# Patient Record
Sex: FEMALE | Race: BLACK OR AFRICAN AMERICAN
[De-identification: names, ages, dates, MRNs, and addresses within clinical notes are randomized per-mention and may not be internally consistent; named-entity substitution may affect disease eponyms.]

---

## 2018-02-02 LAB
ALBUMIN SERPL-MCNC: 3.7 G/DL (ref 3.5–5)
ALP SERPL-CCNC: 85 U/L (ref 38–126)
ALT SERPL-CCNC: 24 U/L (ref 9–52)
ANION GAP SERPL CALC-SCNC: 9 MMOL/L (ref 5–19)
APPEARANCE UR: (no result)
APTT PPP: YELLOW S
AST SERPL-CCNC: 14 U/L (ref 14–36)
BILIRUB DIRECT SERPL-MCNC: 0.3 MG/DL (ref 0–0.4)
BILIRUB SERPL-MCNC: 0.3 MG/DL (ref 0.2–1.3)
BILIRUB UR QL STRIP: NEGATIVE
BUN SERPL-MCNC: 11 MG/DL (ref 7–20)
CALCIUM: 9.8 MG/DL (ref 8.4–10.2)
CHLORIDE SERPL-SCNC: 107 MMOL/L (ref 98–107)
CO2 SERPL-SCNC: 27 MMOL/L (ref 22–30)
ERYTHROCYTE [DISTWIDTH] IN BLOOD BY AUTOMATED COUNT: 16.2 % (ref 11.5–14)
GLUCOSE SERPL-MCNC: 103 MG/DL (ref 75–110)
GLUCOSE UR STRIP-MCNC: NEGATIVE MG/DL
HCT VFR BLD CALC: 39.3 % (ref 36–47)
HGB BLD-MCNC: 13 G/DL (ref 12–15.5)
KETONES UR STRIP-MCNC: NEGATIVE MG/DL
MCH RBC QN AUTO: 24.7 PG (ref 27–33.4)
MCHC RBC AUTO-ENTMCNC: 33.1 G/DL (ref 32–36)
MCV RBC AUTO: 75 FL (ref 80–97)
NITRITE UR QL STRIP: NEGATIVE
PH UR STRIP: 6 [PH] (ref 5–9)
PLATELET # BLD: 363 10^3/UL (ref 150–450)
POTASSIUM SERPL-SCNC: 4.6 MMOL/L (ref 3.6–5)
PROT SERPL-MCNC: 6.6 G/DL (ref 6.3–8.2)
PROT UR STRIP-MCNC: NEGATIVE MG/DL
RBC # BLD AUTO: 5.27 10^6/UL (ref 3.72–5.28)
SODIUM SERPL-SCNC: 143.1 MMOL/L (ref 137–145)
SP GR UR STRIP: 1.02
UROBILINOGEN UR-MCNC: NEGATIVE MG/DL (ref ?–2)
WBC # BLD AUTO: 7.3 10^3/UL (ref 4–10.5)

## 2018-02-08 ENCOUNTER — HOSPITAL ENCOUNTER (INPATIENT)
Dept: HOSPITAL 62 - INOR | Age: 53
LOS: 2 days | Discharge: HOME | DRG: 743 | End: 2018-02-10
Attending: OBSTETRICS & GYNECOLOGY | Admitting: OBSTETRICS & GYNECOLOGY
Payer: MEDICAID

## 2018-02-08 DIAGNOSIS — N92.0: ICD-10-CM

## 2018-02-08 DIAGNOSIS — D25.1: Primary | ICD-10-CM

## 2018-02-08 DIAGNOSIS — F17.210: ICD-10-CM

## 2018-02-08 LAB
ADD MANUAL DIFF: NO
BASOPHILS # BLD AUTO: 0.1 10^3/UL (ref 0–0.2)
BASOPHILS NFR BLD AUTO: 0.5 % (ref 0–2)
EOSINOPHIL # BLD AUTO: 0 10^3/UL (ref 0–0.6)
EOSINOPHIL NFR BLD AUTO: 0.1 % (ref 0–6)
ERYTHROCYTE [DISTWIDTH] IN BLOOD BY AUTOMATED COUNT: 16.5 % (ref 11.5–14)
HCT VFR BLD CALC: 34.4 % (ref 36–47)
HGB BLD-MCNC: 11 G/DL (ref 12–15.5)
LYMPHOCYTES # BLD AUTO: 1.1 10^3/UL (ref 0.5–4.7)
LYMPHOCYTES NFR BLD AUTO: 7.2 % (ref 13–45)
MCH RBC QN AUTO: 24 PG (ref 27–33.4)
MCHC RBC AUTO-ENTMCNC: 31.9 G/DL (ref 32–36)
MCV RBC AUTO: 75 FL (ref 80–97)
MONOCYTES # BLD AUTO: 0.2 10^3/UL (ref 0.1–1.4)
MONOCYTES NFR BLD AUTO: 1.3 % (ref 3–13)
NEUTROPHILS # BLD AUTO: 14.3 10^3/UL (ref 1.7–8.2)
NEUTS SEG NFR BLD AUTO: 90.9 % (ref 42–78)
PLATELET # BLD: 345 10^3/UL (ref 150–450)
RBC # BLD AUTO: 4.57 10^6/UL (ref 3.72–5.28)
TOTAL CELLS COUNTED % (AUTO): 100 %
WBC # BLD AUTO: 15.7 10^3/UL (ref 4–10.5)

## 2018-02-08 PROCEDURE — 36415 COLL VENOUS BLD VENIPUNCTURE: CPT

## 2018-02-08 PROCEDURE — 0UT20ZZ RESECTION OF BILATERAL OVARIES, OPEN APPROACH: ICD-10-PCS | Performed by: OBSTETRICS & GYNECOLOGY

## 2018-02-08 PROCEDURE — 0UT70ZZ RESECTION OF BILATERAL FALLOPIAN TUBES, OPEN APPROACH: ICD-10-PCS | Performed by: OBSTETRICS & GYNECOLOGY

## 2018-02-08 PROCEDURE — 80053 COMPREHEN METABOLIC PANEL: CPT

## 2018-02-08 PROCEDURE — 88307 TISSUE EXAM BY PATHOLOGIST: CPT

## 2018-02-08 PROCEDURE — 86901 BLOOD TYPING SEROLOGIC RH(D): CPT

## 2018-02-08 PROCEDURE — 81025 URINE PREGNANCY TEST: CPT

## 2018-02-08 PROCEDURE — 86900 BLOOD TYPING SEROLOGIC ABO: CPT

## 2018-02-08 PROCEDURE — 85027 COMPLETE CBC AUTOMATED: CPT

## 2018-02-08 PROCEDURE — 85025 COMPLETE CBC W/AUTO DIFF WBC: CPT

## 2018-02-08 PROCEDURE — 81001 URINALYSIS AUTO W/SCOPE: CPT

## 2018-02-08 PROCEDURE — 86850 RBC ANTIBODY SCREEN: CPT

## 2018-02-08 PROCEDURE — 0UT90ZL RESECTION OF UTERUS, SUPRACERVICAL, OPEN APPROACH: ICD-10-PCS | Performed by: OBSTETRICS & GYNECOLOGY

## 2018-02-08 RX ADMIN — LABETALOL HYDROCHLORIDE SCH MG: 200 TABLET, FILM COATED ORAL at 22:11

## 2018-02-08 RX ADMIN — KETOROLAC TROMETHAMINE SCH MG: 30 INJECTION, SOLUTION INTRAMUSCULAR at 20:24

## 2018-02-08 RX ADMIN — OXYCODONE AND ACETAMINOPHEN PRN TAB: 5; 325 TABLET ORAL at 17:25

## 2018-02-08 RX ADMIN — DOCUSATE SODIUM SCH MG: 100 CAPSULE, LIQUID FILLED ORAL at 14:34

## 2018-02-08 RX ADMIN — DOCUSATE SODIUM SCH MG: 100 CAPSULE, LIQUID FILLED ORAL at 17:24

## 2018-02-08 RX ADMIN — SODIUM CHLORIDE, SODIUM LACTATE, POTASSIUM CHLORIDE, AND CALCIUM CHLORIDE PRN ML: .6; .31; .03; .02 INJECTION, SOLUTION INTRAVENOUS at 16:21

## 2018-02-08 NOTE — OPERATIVE REPORT
Operative Report


DATE OF SURGERY: 02/08/18


PREOPERATIVE DIAGNOSIS: Fibroid uterus heavy menses patient requests 

supracervical hysterectomy and bilateral sublingual oophorectomy


POSTOPERATIVE DIAGNOSIS: Same


OPERATION: Supracervical hysterectomy bilateral sublingual oophorectomy


SURGEON: DEBBY HART ASSISTANT: MARIKA MARSH


ANESTHESIA: GA


TISSUE REMOVED OR ALTERED: Uterus tubes and ovaries


COMPLICATIONS: 





None


ESTIMATED BLOOD LOSS: 1 25 cc


INTRAOPERATIVE FINDINGS: Fibroid uterus normal tubes and ovaries omentum 

appears attached to the umbilicus


PROCEDURE: 





Patient was taken back to the OR and placed in supine position.  General 

anesthesia was induced.  She was prepared and draped in a sterile fashion.  Her 

bladder was drained with Marin catheter.  A low transverse incision was made 

and carried down level of the fascia.  The fascia was nicked in the midline.  

The fascial incision was extended bilaterally using curved Negron scissors.  The 

fascia was  off the rectus muscles using sharp and blunt dissection.  

The rectus muscles were  midline peritoneum was entered without 

incident.  Peritoneal incision extended superiorly and inferiorly taking care 

not to injure bladder.  Bowel contents were packed back with 3 moist lap 

sponges.  And a Mobius retractor was placed.  This gave good view of the 

pelvis.  The uterus was grasped with a Hieu thyroid clamp.  Next the round 

ligaments were clamped cauterized and cut using LigaSure device.  The anterior 

leaf the broad ligament was incised creating a bladder flap.  The 

infundibulopelvic pedicles were isolated.  The ureters were well away from this 

field and the infundibulopelvic pedicles were clamped ligated and cut using 

LigaSure device.  The bladder was taken off the anterior aspect cervix using 

sharp and blunt dissection.  The uterine arteries were clamped ligated and cut 

using LigaSure device.  The upper portion of the cardinal ligament was also 

clamped ligated and cut with LigaSure device.  These's uterus was excised free 

from the cervical stump.  This left approximately 1/2 cm of cervix.  There was 

some bleeding at the left side of the cervix from a branch of the uterine 

artery and this was clamped with a Eboni clamp and made hemostatic with 0 

Vicryl transfixation suture.  The internal cervical stump was cauterized with 

the Bovie.  Angle sutures were placed at 3 and 9:00 using 0 Vicryl.  The 

cervical stump was then closed anterior to posterior with interrupted 0 Vicryl 

sutures.  The pelvis was irrigated and suctioned free of fluid.  Hemostasis was 

assured at all pedicles.  The lap sponges and retractor were removed.  The 

anterior abdominal wall peritoneum was closed with a running 2-0 chromic 

stitch.  The subfascial tissues were inspected.  The fascia was then closed 

with a running 0 Vicryl in 2 segments.  The wound was irrigated Juliana's layer 

was closed with a 2 0 plain gut stitch and skin closed with a running 

subcuticular 4-0 undyed Vicryl stitch.

## 2018-02-09 LAB
ERYTHROCYTE [DISTWIDTH] IN BLOOD BY AUTOMATED COUNT: 16.5 % (ref 11.5–14)
HCT VFR BLD CALC: 29 % (ref 36–47)
HGB BLD-MCNC: 9.4 G/DL (ref 12–15.5)
MCH RBC QN AUTO: 24.3 PG (ref 27–33.4)
MCHC RBC AUTO-ENTMCNC: 32.5 G/DL (ref 32–36)
MCV RBC AUTO: 75 FL (ref 80–97)
PLATELET # BLD: 294 10^3/UL (ref 150–450)
RBC # BLD AUTO: 3.88 10^6/UL (ref 3.72–5.28)
WBC # BLD AUTO: 12.6 10^3/UL (ref 4–10.5)

## 2018-02-09 RX ADMIN — DOCUSATE SODIUM SCH MG: 100 CAPSULE, LIQUID FILLED ORAL at 17:45

## 2018-02-09 RX ADMIN — IBUPROFEN SCH MG: 800 TABLET, FILM COATED ORAL at 17:45

## 2018-02-09 RX ADMIN — KETOROLAC TROMETHAMINE SCH MG: 30 INJECTION, SOLUTION INTRAMUSCULAR at 13:21

## 2018-02-09 RX ADMIN — KETOROLAC TROMETHAMINE SCH MG: 30 INJECTION, SOLUTION INTRAMUSCULAR at 03:44

## 2018-02-09 RX ADMIN — LABETALOL HYDROCHLORIDE SCH MG: 200 TABLET, FILM COATED ORAL at 09:50

## 2018-02-09 RX ADMIN — OXYCODONE AND ACETAMINOPHEN PRN TAB: 5; 325 TABLET ORAL at 20:10

## 2018-02-09 RX ADMIN — DOCUSATE SODIUM SCH MG: 100 CAPSULE, LIQUID FILLED ORAL at 09:52

## 2018-02-09 RX ADMIN — OXYCODONE AND ACETAMINOPHEN PRN TAB: 5; 325 TABLET ORAL at 05:34

## 2018-02-09 RX ADMIN — SODIUM CHLORIDE, SODIUM LACTATE, POTASSIUM CHLORIDE, AND CALCIUM CHLORIDE PRN ML: .6; .31; .03; .02 INJECTION, SOLUTION INTRAVENOUS at 00:36

## 2018-02-09 RX ADMIN — OXYCODONE AND ACETAMINOPHEN PRN TAB: 5; 325 TABLET ORAL at 12:30

## 2018-02-09 NOTE — PDOC PROGRESS REPORT
Subjective


Progress Note for:: 02/09/18


Subjective:: 





She reports passing gas today.  Her pain is controlled.


Reason For Visit: 


N92.0 EXCESSIVE AND FREQUENT MENSTRUATION WITH REG








Physical Exam





- Physical Exam


Vital Signs: 


 











Temp Pulse Resp BP Pulse Ox


 


 98.3 F   48 L  16   134/74 H  89 L


 


 02/09/18 03:38  02/09/18 03:38  02/09/18 03:38  02/09/18 03:38  02/09/18 03:38








 Intake & Output











 02/08/18 02/09/18 02/10/18





 06:59 06:59 06:59


 


Intake Total  4480 


 


Output Total  2900 


 


Balance  1580 


 


Weight  101.6 kg 











General appearance: PRESENT: no acute distress, well-developed, well-nourished


Head exam: PRESENT: atraumatic - Positive bowel sounds.  Dressing is dry., 

normocephalic





Result


Laboratory Results: 


 





 02/09/18 06:44 





 02/02/18 12:20 





 











  02/08/18 02/09/18





  14:54 06:44


 


WBC  15.7 H  12.6 H


 


RBC  4.57  3.88


 


Hgb  11.0 L  9.4 L


 


Hct  34.4 L  29.0 L


 


MCV  75 L  75 L


 


MCH  24.0 L  24.3 L


 


MCHC  31.9 L  32.5


 


RDW  16.5 H  16.5 H


 


Plt Count  345  294


 


Seg Neutrophils %  90.9 H 


 


Lymphocytes %  7.2 L 


 


Monocytes %  1.3 L 


 


Eosinophils %  0.1 


 


Basophils %  0.5 


 


Absolute Neutrophils  14.3 H 


 


Absolute Lymphocytes  1.1 


 


Absolute Monocytes  0.2 


 


Absolute Eosinophils  0.0 


 


Absolute Basophils  0.1 











Impressions: 





POD number one.  Remove talley, saline lock IV





Assessment & Plan





- Diagnosis


(1) Fibroid (bleeding) (uterine)


Qualifiers: 


   Uterine leiomyoma location: intramural   Qualified Code(s): D25.1 - 

Intramural leiomyoma of uterus   


Is this a current diagnosis for this admission?: Yes   





- Time


Time Spent with patient: 15-24 minutes


Anticipated discharge: Home


Within: within 24 hours

## 2018-02-10 VITALS — SYSTOLIC BLOOD PRESSURE: 157 MMHG | DIASTOLIC BLOOD PRESSURE: 94 MMHG

## 2018-02-10 RX ADMIN — IBUPROFEN SCH MG: 800 TABLET, FILM COATED ORAL at 06:11

## 2018-02-10 RX ADMIN — OXYCODONE AND ACETAMINOPHEN PRN TAB: 5; 325 TABLET ORAL at 10:02

## 2018-02-10 RX ADMIN — OXYCODONE AND ACETAMINOPHEN PRN TAB: 5; 325 TABLET ORAL at 02:44

## 2018-02-10 RX ADMIN — IBUPROFEN SCH MG: 800 TABLET, FILM COATED ORAL at 00:59

## 2018-02-10 RX ADMIN — DOCUSATE SODIUM SCH MG: 100 CAPSULE, LIQUID FILLED ORAL at 09:48

## 2018-02-10 NOTE — PDOC DISCHARGE SUMMARY
General





- Admit/Disc Date/PCP


Admission Date/Primary Care Provider: 


  02/08/18 08:04





  BRIDGETTE BOLAÑOS MD


She was admitted for hysterectomy for fibroids.


Discharge Date: 02/10/18





- Discharge Diagnosis


(1) Fibroid (bleeding) (uterine)


Is this a current diagnosis for this admission?: Yes   





- Additional Information


Home Medications: 








Oxycodone HCl/Acetaminophen [Percocet 5-325 mg Tablet] 1 - 2 tab PO Q4 PRN #15 

tablet 07/14/13 


Ibuprofen [Advil] 200 mg PO PRN PRN 02/02/18 


Naproxen [Naprosyn] 500 mg PO PRN PRN 02/02/18 


Tramadol HCl [Ultram] 50 mg PO PRN PRN 02/02/18 


Zolpidem Tartrate [Ambien] 10 mg PO PRN PRN 02/02/18 











History of Present Illness


History of Present Illness: 


SANJUANITA KIDD is a 52 year old female


She requests a supracervical hysterectomy for fibroids.





Hospital Course


Hospital Course: 





The hysterectomy was completed.  She recovered and was able to tolerate a 

regular diet and pain meds.  She is ready to go home today.





Physical Exam





- Physical Exam


Vital Signs: 


 











Temp Pulse Resp BP Pulse Ox


 


 98.5 F   85   22 H  136/69 H  95 


 


 02/10/18 07:38  02/10/18 07:38  02/10/18 07:38  02/10/18 07:38  02/10/18 07:38








 Intake & Output











 02/09/18 02/10/18 02/11/18





 06:59 06:59 06:59


 


Intake Total 4480 1000 


 


Output Total 2900 300 


 


Balance 1580 700 


 


Weight 101.6 kg  











General appearance: PRESENT: no acute distress - Dressing is dry.  Abdomen is 

soft and nontender., well-developed, well-nourished





Result


Laboratory Results: 


 





 02/09/18 06:44 





 02/02/18 12:20 








Impressions: 





She is ready to go home.





Plan


Discharge Plan: 





Home to rest.  Followup later this week.


Time Spent: Less than 30 Minutes

## 2018-08-31 ENCOUNTER — HOSPITAL ENCOUNTER (OUTPATIENT)
Dept: HOSPITAL 62 - OROUT | Age: 53
Discharge: HOME | End: 2018-08-31
Attending: INTERNAL MEDICINE
Payer: MEDICAID

## 2018-08-31 VITALS — SYSTOLIC BLOOD PRESSURE: 135 MMHG | DIASTOLIC BLOOD PRESSURE: 77 MMHG

## 2018-08-31 DIAGNOSIS — K52.9: ICD-10-CM

## 2018-08-31 DIAGNOSIS — E55.9: ICD-10-CM

## 2018-08-31 DIAGNOSIS — E78.5: ICD-10-CM

## 2018-08-31 DIAGNOSIS — Z79.899: ICD-10-CM

## 2018-08-31 DIAGNOSIS — Z79.891: ICD-10-CM

## 2018-08-31 DIAGNOSIS — K57.30: Primary | ICD-10-CM

## 2018-08-31 DIAGNOSIS — K64.8: ICD-10-CM

## 2018-08-31 DIAGNOSIS — M19.90: ICD-10-CM

## 2018-08-31 DIAGNOSIS — F17.210: ICD-10-CM

## 2018-08-31 DIAGNOSIS — E66.9: ICD-10-CM

## 2018-08-31 PROCEDURE — 93005 ELECTROCARDIOGRAM TRACING: CPT

## 2018-08-31 PROCEDURE — 45380 COLONOSCOPY AND BIOPSY: CPT

## 2018-08-31 PROCEDURE — 84132 ASSAY OF SERUM POTASSIUM: CPT

## 2018-08-31 PROCEDURE — 36415 COLL VENOUS BLD VENIPUNCTURE: CPT

## 2018-08-31 PROCEDURE — 93010 ELECTROCARDIOGRAM REPORT: CPT

## 2018-08-31 PROCEDURE — 88305 TISSUE EXAM BY PATHOLOGIST: CPT

## 2018-08-31 NOTE — EKG REPORT
SEVERITY:- ABNORMAL ECG -

SINUS RHYTHM

LEFT VENTRICULAR HYPERTROPHY

:

Confirmed by: Pablito Hughes MD 31-Aug-2018 12:59:25